# Patient Record
Sex: FEMALE | Race: WHITE | Employment: UNEMPLOYED | URBAN - METROPOLITAN AREA
[De-identification: names, ages, dates, MRNs, and addresses within clinical notes are randomized per-mention and may not be internally consistent; named-entity substitution may affect disease eponyms.]

---

## 2019-09-24 ENCOUNTER — APPOINTMENT (OUTPATIENT)
Dept: GENERAL RADIOLOGY | Age: 29
End: 2019-09-24
Payer: COMMERCIAL

## 2019-09-24 ENCOUNTER — HOSPITAL ENCOUNTER (EMERGENCY)
Age: 29
Discharge: HOME | End: 2019-09-24
Payer: COMMERCIAL

## 2019-09-24 VITALS
TEMPERATURE: 98.4 F | OXYGEN SATURATION: 99 % | SYSTOLIC BLOOD PRESSURE: 122 MMHG | RESPIRATION RATE: 16 BRPM | WEIGHT: 170 LBS | DIASTOLIC BLOOD PRESSURE: 86 MMHG | BODY MASS INDEX: 28.32 KG/M2 | HEART RATE: 99 BPM | HEIGHT: 65 IN

## 2019-09-24 DIAGNOSIS — S59.911A INJURY OF RIGHT FOREARM, INITIAL ENCOUNTER: Primary | ICD-10-CM

## 2019-09-24 PROCEDURE — 73090 X-RAY EXAM OF FOREARM: CPT

## 2019-09-24 PROCEDURE — 99283 EMERGENCY DEPT VISIT LOW MDM: CPT

## 2019-09-24 ASSESSMENT — PAIN DESCRIPTION - PAIN TYPE: TYPE: ACUTE PAIN

## 2019-09-24 ASSESSMENT — PAIN DESCRIPTION - ORIENTATION: ORIENTATION: RIGHT

## 2019-09-24 ASSESSMENT — PAIN DESCRIPTION - LOCATION: LOCATION: ARM

## 2019-09-24 ASSESSMENT — PAIN SCALES - GENERAL: PAINLEVEL_OUTOF10: 4

## 2019-09-24 NOTE — ED NOTES
Discharge instructions discussed with no questions or concerns. Pt. Juan R Hill understanding to follow up with orthopedics if she is not feeling better. Pt's. Right forearm wrapped with ace wrap. Pt. Ambulatory upon discharge with no signs of distress noted.        Karon Foreman RN  09/24/19 [id number]

## 2021-02-27 ENCOUNTER — HOSPITAL ENCOUNTER (INPATIENT)
Age: 31
LOS: 1 days | Discharge: HOME | DRG: 465 | End: 2021-02-28
Attending: INTERNAL MEDICINE | Admitting: INTERNAL MEDICINE
Payer: COMMERCIAL

## 2021-02-27 DIAGNOSIS — N17.9 AKI (ACUTE KIDNEY INJURY) (HCC): ICD-10-CM

## 2021-02-27 DIAGNOSIS — N13.30 HYDROURETERONEPHROSIS: Primary | ICD-10-CM

## 2021-02-27 DIAGNOSIS — N20.0 KIDNEY STONE: ICD-10-CM

## 2021-02-27 PROCEDURE — 96375 TX/PRO/DX INJ NEW DRUG ADDON: CPT

## 2021-02-27 PROCEDURE — 96374 THER/PROPH/DIAG INJ IV PUSH: CPT

## 2021-02-27 PROCEDURE — 99283 EMERGENCY DEPT VISIT LOW MDM: CPT

## 2021-02-27 RX ORDER — 0.9 % SODIUM CHLORIDE 0.9 %
1000 INTRAVENOUS SOLUTION INTRAVENOUS ONCE
Status: COMPLETED | OUTPATIENT
Start: 2021-02-27 | End: 2021-02-28

## 2021-02-27 ASSESSMENT — PAIN SCALES - GENERAL: PAINLEVEL_OUTOF10: 8

## 2021-02-28 ENCOUNTER — APPOINTMENT (OUTPATIENT)
Dept: GENERAL RADIOLOGY | Age: 31
DRG: 465 | End: 2021-02-28
Payer: COMMERCIAL

## 2021-02-28 ENCOUNTER — APPOINTMENT (OUTPATIENT)
Dept: CT IMAGING | Age: 31
DRG: 465 | End: 2021-02-28
Payer: COMMERCIAL

## 2021-02-28 ENCOUNTER — ANESTHESIA (OUTPATIENT)
Dept: OPERATING ROOM | Age: 31
DRG: 465 | End: 2021-02-28
Payer: COMMERCIAL

## 2021-02-28 ENCOUNTER — ANESTHESIA EVENT (OUTPATIENT)
Dept: OPERATING ROOM | Age: 31
DRG: 465 | End: 2021-02-28
Payer: COMMERCIAL

## 2021-02-28 VITALS
DIASTOLIC BLOOD PRESSURE: 78 MMHG | TEMPERATURE: 97.8 F | HEART RATE: 61 BPM | WEIGHT: 214 LBS | OXYGEN SATURATION: 98 % | HEIGHT: 65 IN | BODY MASS INDEX: 35.65 KG/M2 | SYSTOLIC BLOOD PRESSURE: 114 MMHG | RESPIRATION RATE: 18 BRPM

## 2021-02-28 VITALS
SYSTOLIC BLOOD PRESSURE: 81 MMHG | RESPIRATION RATE: 8 BRPM | OXYGEN SATURATION: 100 % | DIASTOLIC BLOOD PRESSURE: 50 MMHG

## 2021-02-28 PROBLEM — F43.23 ADJUSTMENT DISORDER WITH MIXED ANXIETY AND DEPRESSED MOOD: Status: ACTIVE | Noted: 2017-03-15

## 2021-02-28 PROBLEM — N13.30 HYDROURETERONEPHROSIS: Status: ACTIVE | Noted: 2021-02-28

## 2021-02-28 LAB
A/G RATIO: 1.5 (ref 1.1–2.2)
ALBUMIN SERPL-MCNC: 3.9 G/DL (ref 3.4–5)
ALBUMIN SERPL-MCNC: 4.5 G/DL (ref 3.4–5)
ALP BLD-CCNC: 72 U/L (ref 40–129)
ALT SERPL-CCNC: 19 U/L (ref 10–40)
ANION GAP SERPL CALCULATED.3IONS-SCNC: 8 MMOL/L (ref 3–16)
ANION GAP SERPL CALCULATED.3IONS-SCNC: 9 MMOL/L (ref 3–16)
AST SERPL-CCNC: 22 U/L (ref 15–37)
BASOPHILS ABSOLUTE: 0.1 K/UL (ref 0–0.2)
BASOPHILS RELATIVE PERCENT: 0.7 %
BILIRUB SERPL-MCNC: <0.2 MG/DL (ref 0–1)
BILIRUBIN URINE: NEGATIVE
BLOOD, URINE: ABNORMAL
BUN BLDV-MCNC: 15 MG/DL (ref 7–20)
BUN BLDV-MCNC: 18 MG/DL (ref 7–20)
CALCIUM SERPL-MCNC: 9 MG/DL (ref 8.3–10.6)
CALCIUM SERPL-MCNC: 9.8 MG/DL (ref 8.3–10.6)
CHLORIDE BLD-SCNC: 102 MMOL/L (ref 99–110)
CHLORIDE BLD-SCNC: 106 MMOL/L (ref 99–110)
CLARITY: CLEAR
CO2: 26 MMOL/L (ref 21–32)
CO2: 29 MMOL/L (ref 21–32)
COLOR: YELLOW
CREAT SERPL-MCNC: 1 MG/DL (ref 0.6–1.1)
CREAT SERPL-MCNC: 1.4 MG/DL (ref 0.6–1.1)
EOSINOPHILS ABSOLUTE: 0.2 K/UL (ref 0–0.6)
EOSINOPHILS RELATIVE PERCENT: 2.8 %
EPITHELIAL CELLS, UA: 1 /HPF (ref 0–5)
GFR AFRICAN AMERICAN: 54
GFR AFRICAN AMERICAN: >60
GFR NON-AFRICAN AMERICAN: 45
GFR NON-AFRICAN AMERICAN: >60
GLOBULIN: 3.1 G/DL
GLUCOSE BLD-MCNC: 112 MG/DL (ref 70–99)
GLUCOSE BLD-MCNC: 97 MG/DL (ref 70–99)
GLUCOSE URINE: NEGATIVE MG/DL
HCG QUALITATIVE: NEGATIVE
HCT VFR BLD CALC: 41.9 % (ref 36–48)
HCT VFR BLD CALC: 43.8 % (ref 36–48)
HEMOGLOBIN: 13.7 G/DL (ref 12–16)
HEMOGLOBIN: 14.5 G/DL (ref 12–16)
HYALINE CASTS: 1 /LPF (ref 0–8)
KETONES, URINE: NEGATIVE MG/DL
LEUKOCYTE ESTERASE, URINE: ABNORMAL
LIPASE: 23 U/L (ref 13–60)
LYMPHOCYTES ABSOLUTE: 2.2 K/UL (ref 1–5.1)
LYMPHOCYTES RELATIVE PERCENT: 28.2 %
MCH RBC QN AUTO: 30.2 PG (ref 26–34)
MCH RBC QN AUTO: 30.3 PG (ref 26–34)
MCHC RBC AUTO-ENTMCNC: 32.6 G/DL (ref 31–36)
MCHC RBC AUTO-ENTMCNC: 33.1 G/DL (ref 31–36)
MCV RBC AUTO: 91.7 FL (ref 80–100)
MCV RBC AUTO: 92.7 FL (ref 80–100)
MICROSCOPIC EXAMINATION: YES
MONOCYTES ABSOLUTE: 0.8 K/UL (ref 0–1.3)
MONOCYTES RELATIVE PERCENT: 10.2 %
NEUTROPHILS ABSOLUTE: 4.5 K/UL (ref 1.7–7.7)
NEUTROPHILS RELATIVE PERCENT: 58.1 %
NITRITE, URINE: NEGATIVE
PDW BLD-RTO: 13.1 % (ref 12.4–15.4)
PDW BLD-RTO: 13.6 % (ref 12.4–15.4)
PH UA: 5.5 (ref 5–8)
PHOSPHORUS: 4 MG/DL (ref 2.5–4.9)
PLATELET # BLD: 213 K/UL (ref 135–450)
PLATELET # BLD: 259 K/UL (ref 135–450)
PMV BLD AUTO: 6.8 FL (ref 5–10.5)
PMV BLD AUTO: 7.3 FL (ref 5–10.5)
POTASSIUM REFLEX MAGNESIUM: 4.4 MMOL/L (ref 3.5–5.1)
POTASSIUM SERPL-SCNC: 4.4 MMOL/L (ref 3.5–5.1)
PROTEIN UA: NEGATIVE MG/DL
RBC # BLD: 4.52 M/UL (ref 4–5.2)
RBC # BLD: 4.78 M/UL (ref 4–5.2)
RBC UA: 15 /HPF (ref 0–4)
SODIUM BLD-SCNC: 140 MMOL/L (ref 136–145)
SODIUM BLD-SCNC: 140 MMOL/L (ref 136–145)
SPECIFIC GRAVITY UA: 1.02 (ref 1–1.03)
TOTAL PROTEIN: 7.6 G/DL (ref 6.4–8.2)
URINE REFLEX TO CULTURE: ABNORMAL
URINE TYPE: ABNORMAL
UROBILINOGEN, URINE: 0.2 E.U./DL
WBC # BLD: 7.7 K/UL (ref 4–11)
WBC # BLD: 8.6 K/UL (ref 4–11)
WBC UA: 3 /HPF (ref 0–5)

## 2021-02-28 PROCEDURE — 85027 COMPLETE CBC AUTOMATED: CPT

## 2021-02-28 PROCEDURE — 80053 COMPREHEN METABOLIC PANEL: CPT

## 2021-02-28 PROCEDURE — 2580000003 HC RX 258: Performed by: PHYSICIAN ASSISTANT

## 2021-02-28 PROCEDURE — 2580000003 HC RX 258: Performed by: ANESTHESIOLOGY

## 2021-02-28 PROCEDURE — C1769 GUIDE WIRE: HCPCS | Performed by: UROLOGY

## 2021-02-28 PROCEDURE — 2709999900 HC NON-CHARGEABLE SUPPLY: Performed by: UROLOGY

## 2021-02-28 PROCEDURE — 2500000003 HC RX 250 WO HCPCS: Performed by: ANESTHESIOLOGY

## 2021-02-28 PROCEDURE — 0TJB8ZZ INSPECTION OF BLADDER, VIA NATURAL OR ARTIFICIAL OPENING ENDOSCOPIC: ICD-10-PCS | Performed by: UROLOGY

## 2021-02-28 PROCEDURE — 6370000000 HC RX 637 (ALT 250 FOR IP): Performed by: UROLOGY

## 2021-02-28 PROCEDURE — 74177 CT ABD & PELVIS W/CONTRAST: CPT

## 2021-02-28 PROCEDURE — 6370000000 HC RX 637 (ALT 250 FOR IP): Performed by: PHYSICIAN ASSISTANT

## 2021-02-28 PROCEDURE — 1200000000 HC SEMI PRIVATE

## 2021-02-28 PROCEDURE — 3700000001 HC ADD 15 MINUTES (ANESTHESIA): Performed by: UROLOGY

## 2021-02-28 PROCEDURE — 3600000004 HC SURGERY LEVEL 4 BASE: Performed by: UROLOGY

## 2021-02-28 PROCEDURE — 83690 ASSAY OF LIPASE: CPT

## 2021-02-28 PROCEDURE — C1894 INTRO/SHEATH, NON-LASER: HCPCS | Performed by: UROLOGY

## 2021-02-28 PROCEDURE — 6360000002 HC RX W HCPCS: Performed by: ANESTHESIOLOGY

## 2021-02-28 PROCEDURE — G0008 ADMIN INFLUENZA VIRUS VAC: HCPCS | Performed by: UROLOGY

## 2021-02-28 PROCEDURE — 2580000003 HC RX 258: Performed by: UROLOGY

## 2021-02-28 PROCEDURE — 90686 IIV4 VACC NO PRSV 0.5 ML IM: CPT | Performed by: UROLOGY

## 2021-02-28 PROCEDURE — 85025 COMPLETE CBC W/AUTO DIFF WBC: CPT

## 2021-02-28 PROCEDURE — 6360000002 HC RX W HCPCS: Performed by: PHYSICIAN ASSISTANT

## 2021-02-28 PROCEDURE — 3700000000 HC ANESTHESIA ATTENDED CARE: Performed by: UROLOGY

## 2021-02-28 PROCEDURE — 81001 URINALYSIS AUTO W/SCOPE: CPT

## 2021-02-28 PROCEDURE — 6360000002 HC RX W HCPCS: Performed by: UROLOGY

## 2021-02-28 PROCEDURE — 7100000000 HC PACU RECOVERY - FIRST 15 MIN: Performed by: UROLOGY

## 2021-02-28 PROCEDURE — 3600000014 HC SURGERY LEVEL 4 ADDTL 15MIN: Performed by: UROLOGY

## 2021-02-28 PROCEDURE — 84703 CHORIONIC GONADOTROPIN ASSAY: CPT

## 2021-02-28 PROCEDURE — 6360000004 HC RX CONTRAST MEDICATION: Performed by: PHYSICIAN ASSISTANT

## 2021-02-28 PROCEDURE — 36415 COLL VENOUS BLD VENIPUNCTURE: CPT

## 2021-02-28 PROCEDURE — 7100000001 HC PACU RECOVERY - ADDTL 15 MIN: Performed by: UROLOGY

## 2021-02-28 RX ORDER — ONDANSETRON 2 MG/ML
4 INJECTION INTRAMUSCULAR; INTRAVENOUS
Status: DISCONTINUED | OUTPATIENT
Start: 2021-02-28 | End: 2021-02-28

## 2021-02-28 RX ORDER — SODIUM CHLORIDE 0.9 % (FLUSH) 0.9 %
10 SYRINGE (ML) INJECTION EVERY 12 HOURS SCHEDULED
Status: DISCONTINUED | OUTPATIENT
Start: 2021-02-28 | End: 2021-02-28 | Stop reason: HOSPADM

## 2021-02-28 RX ORDER — CEFAZOLIN SODIUM 1 G/3ML
INJECTION, POWDER, FOR SOLUTION INTRAMUSCULAR; INTRAVENOUS PRN
Status: DISCONTINUED | OUTPATIENT
Start: 2021-02-28 | End: 2021-02-28 | Stop reason: SDUPTHER

## 2021-02-28 RX ORDER — ONDANSETRON 2 MG/ML
INJECTION INTRAMUSCULAR; INTRAVENOUS PRN
Status: DISCONTINUED | OUTPATIENT
Start: 2021-02-28 | End: 2021-02-28 | Stop reason: SDUPTHER

## 2021-02-28 RX ORDER — MEPERIDINE HYDROCHLORIDE 25 MG/ML
12.5 INJECTION INTRAMUSCULAR; INTRAVENOUS; SUBCUTANEOUS EVERY 5 MIN PRN
Status: DISCONTINUED | OUTPATIENT
Start: 2021-02-28 | End: 2021-02-28

## 2021-02-28 RX ORDER — ACETAMINOPHEN 500 MG
1000 TABLET ORAL EVERY 6 HOURS PRN
Status: DISCONTINUED | OUTPATIENT
Start: 2021-02-28 | End: 2021-02-28 | Stop reason: HOSPADM

## 2021-02-28 RX ORDER — ONDANSETRON 2 MG/ML
4 INJECTION INTRAMUSCULAR; INTRAVENOUS ONCE
Status: COMPLETED | OUTPATIENT
Start: 2021-02-28 | End: 2021-02-28

## 2021-02-28 RX ORDER — HYDRALAZINE HYDROCHLORIDE 20 MG/ML
5 INJECTION INTRAMUSCULAR; INTRAVENOUS EVERY 10 MIN PRN
Status: DISCONTINUED | OUTPATIENT
Start: 2021-02-28 | End: 2021-02-28

## 2021-02-28 RX ORDER — LIDOCAINE HYDROCHLORIDE 20 MG/ML
INJECTION, SOLUTION INFILTRATION; PERINEURAL PRN
Status: DISCONTINUED | OUTPATIENT
Start: 2021-02-28 | End: 2021-02-28 | Stop reason: SDUPTHER

## 2021-02-28 RX ORDER — MORPHINE SULFATE 4 MG/ML
4 INJECTION, SOLUTION INTRAMUSCULAR; INTRAVENOUS
Status: DISCONTINUED | OUTPATIENT
Start: 2021-02-28 | End: 2021-02-28 | Stop reason: HOSPADM

## 2021-02-28 RX ORDER — FENTANYL CITRATE 50 UG/ML
INJECTION, SOLUTION INTRAMUSCULAR; INTRAVENOUS PRN
Status: DISCONTINUED | OUTPATIENT
Start: 2021-02-28 | End: 2021-02-28 | Stop reason: SDUPTHER

## 2021-02-28 RX ORDER — HYDROMORPHONE HCL 110MG/55ML
0.5 PATIENT CONTROLLED ANALGESIA SYRINGE INTRAVENOUS EVERY 5 MIN PRN
Status: DISCONTINUED | OUTPATIENT
Start: 2021-02-28 | End: 2021-02-28

## 2021-02-28 RX ORDER — PROPOFOL 10 MG/ML
INJECTION, EMULSION INTRAVENOUS PRN
Status: DISCONTINUED | OUTPATIENT
Start: 2021-02-28 | End: 2021-02-28 | Stop reason: SDUPTHER

## 2021-02-28 RX ORDER — SODIUM CHLORIDE 9 MG/ML
INJECTION, SOLUTION INTRAVENOUS CONTINUOUS
Status: DISCONTINUED | OUTPATIENT
Start: 2021-02-28 | End: 2021-02-28 | Stop reason: HOSPADM

## 2021-02-28 RX ORDER — OXYCODONE HYDROCHLORIDE AND ACETAMINOPHEN 5; 325 MG/1; MG/1
1 TABLET ORAL
Status: DISCONTINUED | OUTPATIENT
Start: 2021-02-28 | End: 2021-02-28

## 2021-02-28 RX ORDER — MORPHINE SULFATE 4 MG/ML
4 INJECTION, SOLUTION INTRAMUSCULAR; INTRAVENOUS ONCE
Status: COMPLETED | OUTPATIENT
Start: 2021-02-28 | End: 2021-02-28

## 2021-02-28 RX ORDER — MAGNESIUM HYDROXIDE 1200 MG/15ML
LIQUID ORAL CONTINUOUS PRN
Status: COMPLETED | OUTPATIENT
Start: 2021-02-28 | End: 2021-02-28

## 2021-02-28 RX ORDER — SODIUM CHLORIDE 9 MG/ML
INJECTION, SOLUTION INTRAVENOUS CONTINUOUS PRN
Status: DISCONTINUED | OUTPATIENT
Start: 2021-02-28 | End: 2021-02-28 | Stop reason: SDUPTHER

## 2021-02-28 RX ORDER — DEXAMETHASONE SODIUM PHOSPHATE 4 MG/ML
INJECTION, SOLUTION INTRA-ARTICULAR; INTRALESIONAL; INTRAMUSCULAR; INTRAVENOUS; SOFT TISSUE PRN
Status: DISCONTINUED | OUTPATIENT
Start: 2021-02-28 | End: 2021-02-28 | Stop reason: SDUPTHER

## 2021-02-28 RX ORDER — MORPHINE SULFATE 2 MG/ML
2 INJECTION, SOLUTION INTRAMUSCULAR; INTRAVENOUS
Status: DISCONTINUED | OUTPATIENT
Start: 2021-02-28 | End: 2021-02-28 | Stop reason: HOSPADM

## 2021-02-28 RX ORDER — ONDANSETRON 2 MG/ML
4 INJECTION INTRAMUSCULAR; INTRAVENOUS EVERY 6 HOURS PRN
Status: DISCONTINUED | OUTPATIENT
Start: 2021-02-28 | End: 2021-02-28 | Stop reason: HOSPADM

## 2021-02-28 RX ORDER — SODIUM CHLORIDE 0.9 % (FLUSH) 0.9 %
10 SYRINGE (ML) INJECTION PRN
Status: DISCONTINUED | OUTPATIENT
Start: 2021-02-28 | End: 2021-02-28 | Stop reason: HOSPADM

## 2021-02-28 RX ORDER — LABETALOL HYDROCHLORIDE 5 MG/ML
5 INJECTION, SOLUTION INTRAVENOUS EVERY 10 MIN PRN
Status: DISCONTINUED | OUTPATIENT
Start: 2021-02-28 | End: 2021-02-28

## 2021-02-28 RX ADMIN — Medication 10 ML: at 08:18

## 2021-02-28 RX ADMIN — PROPOFOL 200 MG: 10 INJECTION, EMULSION INTRAVENOUS at 11:50

## 2021-02-28 RX ADMIN — CEFAZOLIN 2000 MG: 1 INJECTION, POWDER, FOR SOLUTION INTRAVENOUS at 11:50

## 2021-02-28 RX ADMIN — LIDOCAINE HYDROCHLORIDE 50 MG: 20 INJECTION, SOLUTION INFILTRATION; PERINEURAL at 11:50

## 2021-02-28 RX ADMIN — MORPHINE SULFATE 4 MG: 4 INJECTION, SOLUTION INTRAMUSCULAR; INTRAVENOUS at 01:27

## 2021-02-28 RX ADMIN — ONDANSETRON 4 MG: 2 INJECTION INTRAMUSCULAR; INTRAVENOUS at 01:27

## 2021-02-28 RX ADMIN — ACETAMINOPHEN 1000 MG: 500 TABLET, FILM COATED ORAL at 13:02

## 2021-02-28 RX ADMIN — MORPHINE SULFATE 2 MG: 2 INJECTION, SOLUTION INTRAMUSCULAR; INTRAVENOUS at 08:19

## 2021-02-28 RX ADMIN — SODIUM CHLORIDE: 9 INJECTION, SOLUTION INTRAVENOUS at 04:17

## 2021-02-28 RX ADMIN — INFLUENZA A VIRUS A/VICTORIA/2454/2019 IVR-207 (H1N1) ANTIGEN (PROPIOLACTONE INACTIVATED), INFLUENZA A VIRUS A/HONG KONG/2671/2019 IVR-208 (H3N2) ANTIGEN (PROPIOLACTONE INACTIVATED), INFLUENZA B VIRUS B/VICTORIA/705/2018 BVR-11 ANTIGEN (PROPIOLACTONE INACTIVATED), INFLUENZA B VIRUS B/PHUKET/3073/2013 BVR-1B ANTIGEN (PROPIOLACTONE INACTIVATED) 0.5 ML: 15; 15; 15; 15 INJECTION, SUSPENSION INTRAMUSCULAR at 14:19

## 2021-02-28 RX ADMIN — ONDANSETRON 4 MG: 2 INJECTION INTRAMUSCULAR; INTRAVENOUS at 11:50

## 2021-02-28 RX ADMIN — SERTRALINE 50 MG: 50 TABLET, FILM COATED ORAL at 08:16

## 2021-02-28 RX ADMIN — IOPAMIDOL 75 ML: 755 INJECTION, SOLUTION INTRAVENOUS at 01:08

## 2021-02-28 RX ADMIN — ENOXAPARIN SODIUM 40 MG: 40 INJECTION SUBCUTANEOUS at 08:17

## 2021-02-28 RX ADMIN — SODIUM CHLORIDE 1000 ML: 9 INJECTION, SOLUTION INTRAVENOUS at 00:33

## 2021-02-28 RX ADMIN — FENTANYL CITRATE 100 MCG: 50 INJECTION INTRAMUSCULAR; INTRAVENOUS at 11:50

## 2021-02-28 RX ADMIN — MORPHINE SULFATE 2 MG: 2 INJECTION, SOLUTION INTRAMUSCULAR; INTRAVENOUS at 04:20

## 2021-02-28 RX ADMIN — DEXAMETHASONE SODIUM PHOSPHATE 8 MG: 4 INJECTION, SOLUTION INTRAMUSCULAR; INTRAVENOUS at 11:50

## 2021-02-28 RX ADMIN — SODIUM CHLORIDE: 9 INJECTION, SOLUTION INTRAVENOUS at 11:52

## 2021-02-28 ASSESSMENT — PAIN DESCRIPTION - PAIN TYPE: TYPE: ACUTE PAIN

## 2021-02-28 ASSESSMENT — ENCOUNTER SYMPTOMS
VOMITING: 0
NAUSEA: 0
SHORTNESS OF BREATH: 0
ABDOMINAL PAIN: 1
DIARRHEA: 0

## 2021-02-28 ASSESSMENT — PAIN SCALES - GENERAL
PAINLEVEL_OUTOF10: 5
PAINLEVEL_OUTOF10: 5
PAINLEVEL_OUTOF10: 4
PAINLEVEL_OUTOF10: 4

## 2021-02-28 ASSESSMENT — PULMONARY FUNCTION TESTS
PIF_VALUE: 14
PIF_VALUE: 4
PIF_VALUE: 3
PIF_VALUE: 4
PIF_VALUE: 14
PIF_VALUE: 4
PIF_VALUE: 14
PIF_VALUE: 14
PIF_VALUE: 3
PIF_VALUE: 14

## 2021-02-28 ASSESSMENT — PAIN DESCRIPTION - FREQUENCY: FREQUENCY: CONTINUOUS

## 2021-02-28 ASSESSMENT — PAIN DESCRIPTION - LOCATION
LOCATION: ABDOMEN
LOCATION: HEAD

## 2021-02-28 ASSESSMENT — PAIN DESCRIPTION - ONSET: ONSET: ON-GOING

## 2021-02-28 ASSESSMENT — PAIN DESCRIPTION - ORIENTATION: ORIENTATION: LEFT

## 2021-02-28 NOTE — ED PROVIDER NOTES
[facility]        Pt Name: Brittany Cash  MRN: [id number]  [place] 1990  Date of evaluation: 2/27/2021  Provider: Jorge Tillman PA-C  [facility]P: [facility] ([facility])    АЛЕКСАНДР. I have evaluated this patient. My supervising physician was available for consultation. CHIEF COMPLAINT       Chief Complaint   Patient presents with    Abdominal Pain     LLQ abdominal pain today. pt can't sit down in triage. denies urinary symptoms. HISTORY OF PRESENT ILLNESS   (Location, Timing/Onset, Context/Setting, Quality, Duration, Modifying Factors, Severity, Associated Signs and Symptoms)  Note limiting factors. Brittany Cash is a 31 y.o. female patient presents emergency department for evaluation of left upper quadrant abdominal pain that began today. Patient states that her pain started around 10 PM.  Patient states she woke up this morning and just generally did not feel well but then had sharp left upper quadrant abdominal pain at 10 PM.  Patient states it has gotten slightly better since she arrived here at the hospital but is still severe. Patient did not take any medication prior to arrival here in the emergency department. She denies any nausea vomiting or diarrhea. Denies any urinary symptoms. Patient states the pain radiates into her left-sided back. Denies any fevers at home. Denies any history of kidney stones. Denies any additional symptoms at this time. Nursing Notes were all reviewed and agreed with or any disagreements were addressed in the HPI. REVIEW OF SYSTEMS    (2-9 systems for level 4, 10 or more for level 5)     Review of Systems   Constitutional: Negative for fatigue and fever. HENT: Negative. Eyes: Negative for visual disturbance. Respiratory: Negative for shortness of breath. Cardiovascular: Negative for chest pain. General: She is not in acute distress. Appearance: She is well-developed. She is not ill-appearing, toxic-appearing or diaphoretic. HENT:      Head: Normocephalic and atraumatic. Nose: Nose normal.   Eyes:      General:         Right eye: No discharge. Left eye: No discharge. Neck:      Musculoskeletal: Normal range of motion and neck supple. Cardiovascular:      Rate and Rhythm: Normal rate and regular rhythm. Heart sounds: Normal heart sounds. No murmur. No gallop. Pulmonary:      Effort: Pulmonary effort is normal. No respiratory distress. Breath sounds: Normal breath sounds. No wheezing, rhonchi or rales. Abdominal:      General: Abdomen is flat. Bowel sounds are normal.      Palpations: Abdomen is soft. Tenderness: There is abdominal tenderness in the left upper quadrant. There is no right CVA tenderness, left CVA tenderness, guarding or rebound. Musculoskeletal: Normal range of motion. Skin:     General: Skin is warm and dry. Capillary Refill: Capillary refill takes less than 2 seconds. Coloration: Skin is not pale. Neurological:      General: No focal deficit present. Mental Status: She is alert and oriented to person, place, and time.    Psychiatric:         Behavior: Behavior normal.         DIAGNOSTIC RESULTS   LABS:    Labs Reviewed   COMPREHENSIVE METABOLIC PANEL W/ REFLEX TO MG FOR LOW K - Abnormal; Notable for the following components:       Result Value    CREATININE 1.4 (*)     GFR Non- 45 (*)     GFR  54 (*)     All other components within normal limits    Narrative:     Performed at:  [facility] [address], [place], [facility] [address]   Phone [phone]   URINE RT REFLEX TO CULTURE - Abnormal; Notable for the following components:    Blood, Urine MODERATE (*)     Leukocyte Esterase, Urine SMALL (*)     All other components within normal limits    Narrative: Performed at:  [facility]  [address],  [place], [address]   Phone [phone]   MICROSCOPIC URINALYSIS - Abnormal; Notable for the following components:    RBC, UA 15 (*)     All other components within normal limits    Narrative:     Performed at:  [facility] [address],  [place], [address]   Phone [phone]   CBC WITH AUTO DIFFERENTIAL    Narrative:     Performed at:  [facility] [address],  [place], [address]   Phone [phone]   LIPASE    Narrative:     Performed at:  [facility] [address] [facility],  [place], [address]   Phone [phone]   HCG, SERUM, QUALITATIVE    Narrative:     Performed at:  [facility] [address],  [place], [address]   Phone [phone]       All other labs were within normal range or not returned as of this dictation. EKG: All EKG's are interpreted by the Emergency Department Physician in the absence of a cardiologist.  Please see their note for interpretation of EKG. RADIOLOGY:   Non-plain film images such as CT, Ultrasound and MRI are read by the radiologist. Plain radiographic images are visualized and preliminarily interpreted by the ED Provider with the below findings:        Interpretation per the Radiologist below, if available at the time of this note:    CT ABDOMEN PELVIS W IV CONTRAST Additional Contrast? None   Final Result   There is a 6 mm obstructing calculus in the left ureterovesical junction   resulting in mild left hydroureteronephrosis.       There is a 4 cm left adnexal cyst.           Ct Abdomen Pelvis W Iv Contrast Additional Contrast? None    Result Date: 2/28/2021 EXAMINATION: CT OF THE ABDOMEN AND PELVIS WITH CONTRAST 2/28/2021 12:54 am TECHNIQUE: CT of the abdomen and pelvis was performed with the administration of intravenous contrast. Multiplanar reformatted images are provided for review. Dose modulation, iterative reconstruction, and/or weight based adjustment of the mA/kV was utilized to reduce the radiation dose to as low as reasonably achievable. COMPARISON: None. HISTORY: ORDERING SYSTEM PROVIDED HISTORY: LUQ abd and flank pain FINDINGS: Lower Chest:   Visualized portion of the lower chest demonstrates no acute abnormality. Organs: There is a 6 mm obstructing calculus in the left ureterovesical junction resulting in mild left hydroureteronephrosis. The liver, spleen, pancreas, gallbladder, adrenal glands, and right kidney demonstrate no acute abnormality. GI/Bowel: No dilated or thick-walled loops of bowel. Appendix is unremarkable. Pelvis: There is a 4 cm left adnexal cyst. Peritoneum/Retroperitoneum: No abdominal aortic aneurysm. No adenopathy. No ascites. No free intraperitoneal air. Bones/Soft Tissues: No lytic or blastic osseous lesion. There is a 6 mm obstructing calculus in the left ureterovesical junction resulting in mild left hydroureteronephrosis.  There is a 4 cm left adnexal cyst.           PROCEDURES   Unless otherwise noted below, none     Procedures    CRITICAL CARE TIME   N/A    CONSULTS:  IP CONSULT TO UROLOGY  IP CONSULT TO HOSPITALIST      EMERGENCY DEPARTMENT COURSE and DIFFERENTIAL DIAGNOSIS/MDM:   Vitals:    Vitals:    02/28/21 0015 02/28/21 0030 02/28/21 0045 02/28/21 0100   BP: 113/67 129/69 126/75 107/66   Pulse:       Resp:       Temp:       TempSrc:       SpO2: 100% 100% 100% 99%   Weight:       Height:           Patient was given the following medications:  Medications   0.9 % sodium chloride bolus (1,000 mLs Intravenous New Bag 2/28/21 0033)   iopamidol (ISOVUE-370) 76 % injection 75 mL (75 mLs Intravenous Given 2/28/21 0108) morphine injection 4 mg (4 mg Intravenous Given 2/28/21 0127)   ondansetron (ZOFRAN) injection 4 mg (4 mg Intravenous Given 2/28/21 0127)         Patient presents to the emergency department for evaluation of left upper quadrant abdominal pain that started at 10 PM.  Patient is alert and oriented no acute distress. Vitals are stable and she is afebrile. Patient has point tenderness to the left upper quadrant abdomen. No CVA tenderness. Creatinine is 1.4 and GFR is 45. This is an acute change. Urine does not appear infected. No leukocytosis. Patient is given morphine and Zofran as well as 1 L of fluids here in the emergency department. CT shows 6 mm left UVJ ureterolithiasis with left hydroureteronephrosis. Given that patient has a new UMA she will be admitted to the hospitalist for further management overnight. Urology Vandana Shine was consulted and states the patient should be made n.p.o. for him to see in the morning. Patient is amenable to inpatient plan at this time and is excepted by hospitalist Cesar Salcido. FINAL IMPRESSION      1. Kidney stone    2. UMA (acute kidney injury) [facility])          DISPOSITION/PLAN   DISPOSITION Decision To Admit 02/28/2021 02:08:35 AM      PATIENT REFERREDTO:  No follow-up provider specified.     DISCHARGE MEDICATIONS:  New Prescriptions    No medications on file       DISCONTINUED MEDICATIONS:  Discontinued Medications    No medications on file              (Please note that portions of this note were completed with a voice recognition program.  Efforts were made to edit the dictations but occasionally words are mis-transcribed.)    Martha Kunz PA-C (electronically signed)            Martha Kunz PA-C  02/28/21 2991

## 2021-02-28 NOTE — DISCHARGE SUMMARY
Patient: Ajay Mahan     Gender: female  : 1990   Age: 31 y.o.   MRN: [id number]    Admitting Physician: Mee Zarate MD  Discharge Physician: Nakul Curran MD     Code Status: Full Code     Admit Date: 2021   Discharge Date:   2021    Disposition:  Home    Discharge Diagnoses:    Left flank pain due to ureteral stone with hydronephrosis and UMA spontaneous passage of stone    Active Hospital Problems    Diagnosis Date Noted    Hydroureteronephrosis [N13.30] 2021       Follow-up appointments:  one week    Outpatient to do list: BMP in 5-7 days     Condition at Discharge:  [address] Course:   53-year-old admitted to the hospital with left flank pain and CT abdomen noted to have hydronephrosis with left ureteral stone also creatinine noted to be elevated at 1.4 started on IV fluids pain medications urology were consulted by next day the creatinine had come down to 1 her pain and improved she subsequently underwent a cystoscopy and ureteroscopy with did not show any stone it was felt that there was spontaneous passage of the stone patient did well and is being discharged home she has been advised to go back to the emergency department if she has any fevers blood in urine or any increased pain she voiced understanding of given her basic metabolic profile to be done in 5 to 7 days and follow-up with the primary care physician    Discharge Medications:   Current Discharge Medication List        Current Discharge Medication List        Current Discharge Medication List      CONTINUE these medications which have NOT CHANGED    Details   sertraline (ZOLOFT) 25 MG tablet Take 1 tablet by mouth daily  Qty: 30 tablet, Refills: 0      HYDROcodone-acetaminophen (NORCO) 5-325 MG per tablet Take 1 tablet by mouth every 6 hours as needed for Pain .      pyridoxine (B-6) 25 MG tablet Take 1 tablet by mouth every 6 hours  Qty: 100 tablet, Refills: 0 metoclopramide (REGLAN) 10 MG tablet Take 1 tablet by mouth 4 times daily WARNING:  May cause drowsiness. May impair ability to operate vehicles or machinery. Do not use in combination with alcohol. Qty: 20 tablet, Refills: 0      Prenatal Vit-Fe Fumarate-FA (PRENATAL VITAMIN) 27-0.8 MG TABS Take 1 tablet by mouth daily  Qty: 30 tablet, Refills: 1           Current Discharge Medication List      STOP taking these medications       Ibuprofen (MOTRIN PO) Comments:   Reason for Stopping:               Discharge ROS:  A complete review of systems was asked and negative     Discharge Exam:    /78   Pulse 61   Temp 97.8 °F (36.6 °C) (Oral)   Resp 18   Ht 5' 5\" (1.651 m)   Wt 214 lb (97.1 kg)   SpO2 98%   BMI 35.61 kg/m²   General appearance:  NAD  HEENT:   Normal cephalic, atraumatic, moist mucous membranes, no oropharyngeal erythema or exudate  Heart[de-identified] Normal s1/s2, RRR, no murmurs, gallops, or rubs. no leg edema  Lungs:  Normal respiratory effort. Clear to auscultation, bilaterally without Rales/Wheezes/Rhonchi. Abdomen: Soft, non-tender, non-distended, bowel sounds present, no masses  Musculoskeletal:  No clubbing, no cyanosis, *  Neurologic:  Neurovascularly intact without any focal sensory/motor deficits. Cranial nerves: II-XII intact, grossly non-focal.    Labs:  For convenience and continuity at follow-up the following most recent labs are provided:    Lab Results   Component Value Date    WBC 8.6 02/28/2021    HGB 13.7 02/28/2021    HCT 41.9 02/28/2021    MCV 92.7 02/28/2021     02/28/2021     02/28/2021    K 4.4 02/28/2021    K 4.4 02/28/2021     02/28/2021    CO2 26 02/28/2021    BUN 15 02/28/2021    CREATININE 1.0 02/28/2021    CALCIUM 9.0 02/28/2021    PHOS 4.0 02/28/2021    ALKPHOS 72 02/28/2021    ALT 19 02/28/2021    AST 22 02/28/2021    BILITOT <0.2 02/28/2021    LABALBU 3.9 02/28/2021     No results found for: INR

## 2021-02-28 NOTE — PROGRESS NOTES
Patient alert, VSS and O2 sat maintained on RA. Denies pain. Tolerating PO. Patient meets all phase 1 discharge criteria, seen by anesthesia. Will transfer back to [address] [place] [address] in stable condition.

## 2021-02-28 NOTE — H&P
Hospital Medicine History & Physical      Patient Name: Renny Luna    : 1990    [facility]P: [facility]    Date of Service:  Patient seen and examined on 2021     Chief Complaint:  Abdominal pain    History Of Present Illness:    Renny Luna is a 31 y.o. female who presented to ED with complaint of sharp, LUQ abdominal pain which began yesterday evening around 10 PM. Pain radiates through to the left side of her back. She denies fever, chills, dizziness, chest pain, shortness of breath, cough, edema, nausea, vomiting, diarrhea, constipation, urinary symptoms. She denies any history of kidney stones. Labs and imaging performed in ED reviewed. CT abdomen/pelvis notable for 6 mm obstructing calculus in the left UVJ resulting in mild left hydroureteronephrosis. Cr 1.4, baseline 0.5. UA negative for UTI. Past Medical History:    Patient has a past medical history of Adjustment disorder with mixed anxiety and depressed mood. Past Surgical History:    Patient has a past surgical history that includes Dilation and curettage of uterus. Medications Prior to Admission:      Prior to Admission medications    Medication Sig Start Date End Date Taking? Authorizing Provider   sertraline (ZOLOFT) 25 MG tablet Take 1 tablet by mouth daily 17  Yes LOLIS Butler   Ibuprofen (MOTRIN PO) Take by mouth    Historical Provider, MD   HYDROcodone-acetaminophen (NORCO) 5-325 MG per tablet Take 1 tablet by mouth every 6 hours as needed for Pain . Historical Provider, MD   pyridoxine (B-6) 25 MG tablet Take 1 tablet by mouth every 6 hours 16   Margarito De Jesus MD   metoclopramide (REGLAN) 10 MG tablet Take 1 tablet by mouth 4 times daily WARNING:  May cause drowsiness. May impair ability to operate vehicles or machinery.   Do not use in combination with alcohol. 16   Roderick Mitchell MD   Prenatal Vit-Fe Fumarate-FA (PRENATAL VITAMIN) 27-0.8 MG TABS 02/28/21  0030   AST 22   ALT 19   BILITOT <0.2   ALKPHOS 72       COAG  No results for input(s): INR in the last 72 hours. CARDIAC ENZYMES  No results for input(s): TROPONINI in the last 72 hours. LIPIDS  No results found for: CHOL, TRIG, HDL, LDLCALC      Radiology:     CT ABDOMEN PELVIS W IV CONTRAST Additional Contrast? None   Final Result   There is a 6 mm obstructing calculus in the left ureterovesical junction   resulting in mild left hydroureteronephrosis. There is a 4 cm left adnexal cyst.             ASSESSMENT/PLAN:    Calculus of ureterovesical junction  6 mm obstructing stone left UVJ with mild left hydroureteronephrosis  UA negative for UTI  IVF  Pain control  NPO in anticipation of surgery in AM  Urology consulted    UMA  Cr baseline 0.5, today 1.4  Due to above  IVF  Avoid nephrotoxic medications  Renally dose medications  Monitor for electrolyte abnormalities      DVT prophylaxis: Lovenox  Probiotic if on abx: N/A    Diet: Diet NPO Effective Now Exceptions are: Sips of Water with Meds  Code Status: Full Code    Consults:  IP CONSULT TO UROLOGY  IP CONSULT TO HOSPITALIST    Disposition: Admit to Inpatient   ELOS: Greater than two midnights due to medical therapy     Tobias Cooper PA-C    Thank you [facility] for the opportunity to be involved in this patient's care. If you have any questions or concerns please feel free to contact me at [phone].

## 2021-02-28 NOTE — OP NOTE
A 21 fr rigid cystoscope was advance via a normal appearing urethra into the bladder. The bladder was inspected and there were no suspicious lesions, stones or diverticula seen. Attention was turned to the left ureteral orifice and a 0.035 sensor wire was advance up to the renal pelvis under control of fluoroscopy. A semi-rigid ureteroscope was passed alongside the safety wire. The ureteroscope was advanced up to the UPJ and NO stone was seen. There was edema and inflammation in the distal ureter c/w recently obstructed stone location. There was also no stone seen in the bladder space. At this point the ureteroscope was removed, and the wire was removed. The bladder was drained and the cystoscope was removed. At the end of the procedure all counts were correct. The patient tolerated the procedure well and was transported to the PACU in stable condition. Findings: no stone seen in the ureter or bladder lumen. This is c/w a recently passed stone, no longer present. Estimated Blood Loss: minimal                  Drains: none          Specimens: none    Complications: none apparent           Disposition:  PACU - hemodynamically stable.             Lea Nice MD  2/28/2021

## 2021-02-28 NOTE — PROGRESS NOTES
Patient has been discharged per MD orders. Patient verbalizes understanding of all instructions, medications, and follow up. IV has been removed, catheter intact, patient tolerated well. All belongings have been gathered and packed. Family present  to transport patient from hospital. RN transports patient, via wheelchair, to awaiting private vehicle.

## 2021-02-28 NOTE — CONSULTS
[facility] Consult Note  Inpatient Setting - [facility]    Provider: Cb Cruz MD Patient ID:  Admission Date: 2021 Name: Corie Rooney  OR Date: n/a MRN: [id number]   Patient Location: [id number] : 1990  Attending: Mariano Gallegos MD Date of Service: 2021  PCP: [facility]     Diagnoses: UMA  LEFT sided ureteral stone with proximal hydronephrosis    Assessment/Plan:  NPO   Discussed ureteroscopy with risks not limited to infection, blood in the urine, injury, need for additional procedures, anesthesia related, and stent side effects  The decision to proceed to the OR was made on the day of the consultation. All the patients questions were answered in detail. She understands the plan as listed above. CC:   Chief Complaint   Patient presents with    Abdominal Pain     LLQ abdominal pain today. pt can't sit down in triage. denies urinary symptoms. HPI: Corie Rooney is a 31 y.o. female. I saw the patient today for LEFT side pain, and associated symptoms of nausea and emesis, that have been present for days. Symptoms relieved by some pain medications and aggravated by the obstructing stone and relative dehydration. She has tried the following treatments: she has no history of stones. Past Medical History:   She has a past medical history of Adjustment disorder with mixed anxiety and depressed mood. Past Surgical History:  She has a past surgical history that includes Dilation and curettage of uterus. Allergies:   No Known Allergies    Social History:  She reports that she has been smoking cigarettes. She has been smoking about 0.25 packs per day. She has never used smokeless tobacco. She reports that she does not drink alcohol.     Family History: family history is not on file. Medications:   Scheduled Meds:   sertraline  50 mg Oral Daily    sodium chloride flush  10 mL Intravenous 2 times per day    enoxaparin  40 mg Subcutaneous Daily    influenza virus vaccine  0.5 mL Intramuscular Prior to discharge     Continuous Infusions:   sodium chloride 100 mL/hr at 02/28/21 0417     PRN Meds:sodium chloride flush, magnesium hydroxide, acetaminophen, ondansetron, morphine **OR** morphine    Review of Systems:  Constitutional: Negative for fever    Genitourinary: see HPI  Eyes: negative for sudden change in vision  EENT: no complaints  Cardiovascular: Negative for chest pain  Respiratory: Negative for shortness of breath  Gastrointestinal: Negative for nausea  Musculoskeletal: Negative for back pain   Neurological: Negative for weakness  Psychiatric: Negative for anxiety  Integumentary: Negative for rashes or adenopathy     Physical Exam:  Vitals:    02/28/21 0800   BP: 104/70   Pulse: 80   Resp: 18   Temp: 98.3 °F (36.8 °C)   SpO2: 98%     Constitutional: NAD, well-developed, well-nourished. HEENT: MMM. Hearing intact. PERRL  Neck: no thyroid masses appreciated. Trachea is midline. Neck appears unremarkable   Lymph: no palpable adenopathy in supraclavicular, or axillary lymph nodes  Cardiovascular: Regular rate. No peripheral edema  Respiratory: Respirations are even and non-labored. No audible breath sounds. Abdomen: Soft. No distension, tenderness, hernias, masses or guarding. No CVA tenderness. No hernias appreciated. Liver and spleen appear normal  Psychiatric: A + O x 3, normal affect. Insight appears intact. Muskuloskeletal: ANDREW x 4   Skin: Pink, warm and dry. No rashes on face and arms.     Labs:  Lab Results   Component Value Date    WBC 8.6 02/28/2021    HGB 13.7 02/28/2021    HCT 41.9 02/28/2021    MCV 92.7 02/28/2021     02/28/2021     Lab Results   Component Value Date    CREATININE 1.0 02/28/2021    BUN 15 02/28/2021  02/28/2021    K 4.4 02/28/2021     02/28/2021    CO2 26 02/28/2021       Imaging:   Impression   There is a 6 mm obstructing calculus in the left ureterovesical junction   resulting in mild left hydroureteronephrosis.       There is a 4 cm left adnexal cyst.     Tommie Flores MD  2/28/2021

## 2021-02-28 NOTE — ANESTHESIA PRE PROCEDURE
Department of Anesthesiology  Preprocedure Note       Name:  Gonzales Salmon   Age:  31 y.o.  :  1990                                          MRN:  [id number]         Date:  2021      Surgeon: Sabi Borja):  Kendall Louis MD    Procedure: Procedure(s):  CYSTOSCOPY, LEFT URETEROSCOPY, LEFT RETROGRADE PYELOGRAM LASER LITHOTRIPSY, LEFT STENT    Medications prior to admission:   Prior to Admission medications    Medication Sig Start Date End Date Taking? Authorizing Provider   Ibuprofen (MOTRIN PO) Take by mouth   Yes Historical Provider, MD   sertraline (ZOLOFT) 25 MG tablet Take 1 tablet by mouth daily 17  Yes LOLIS Ramirez   HYDROcodone-acetaminophen (NORCO) 5-325 MG per tablet Take 1 tablet by mouth every 6 hours as needed for Pain . Historical Provider, MD   pyridoxine (B-6) 25 MG tablet Take 1 tablet by mouth every 6 hours 16   Devyn Lira MD   metoclopramide (REGLAN) 10 MG tablet Take 1 tablet by mouth 4 times daily WARNING:  May cause drowsiness. May impair ability to operate vehicles or machinery.   Do not use in combination with alcohol. 16   Barbra Fernandez MD   Prenatal Vit-Fe Fumarate-FA (PRENATAL VITAMIN) 27-0.8 MG TABS Take 1 tablet by mouth daily 16   Barbra Fernandez MD       Current medications:    Current Facility-Administered Medications   Medication Dose Route Frequency Provider Last Rate Last Admin    sertraline (ZOLOFT) tablet 50 mg  50 mg Oral Daily Arianne Dial, PA-C   50 mg at 21 0816    0.9 % sodium chloride infusion   Intravenous Continuous Arianne Dial, PA-C 100 mL/hr at 21 0417 New Bag at 21 0417    sodium chloride flush 0.9 % injection 10 mL  10 mL Intravenous 2 times per day Arianne Dial, PA-C   10 mL at 21 0818    sodium chloride flush 0.9 % injection 10 mL  10 mL Intravenous PRN Arianne Dial, PA-C  enoxaparin (LOVENOX) injection 40 mg  40 mg Subcutaneous Daily Denney Runner, PA-C   40 mg at 02/28/21 5654    magnesium hydroxide (MILK OF MAGNESIA) 400 MG/5ML suspension 30 mL  30 mL Oral Daily PRN Denney Runner, PA-C        acetaminophen (TYLENOL) tablet 1,000 mg  1,000 mg Oral Q6H PRN Denney Runner, PA-C        [facility]ron [facility]) injection 4 mg  4 mg Intravenous Q6H PRN Denney Runner, PA-C        morphine (PF) injection 2 mg  2 mg Intravenous Q3H PRN Denney Runner, PA-C   2 mg at 02/28/21 6449    Or    morphine injection 4 mg  4 mg Intravenous Q3H PRN Denney Runner, PA-C        influenza quadrivalent split vaccine (FLUZONE;FLUARIX;FLULAVAL;AFLURIA) injection 0.5 mL  0.5 mL Intramuscular Prior to discharge Bruna Boudreaux MD           Allergies:  No Known Allergies    Problem List:    Patient Active Problem List   Diagnosis Code    Adjustment disorder with mixed anxiety and depressed mood F43.23    Hydroureteronephrosis N13.30       Past Medical History:        Diagnosis Date    Adjustment disorder with mixed anxiety and depressed mood        Past Surgical History:        Procedure Laterality Date    DILATION AND CURETTAGE OF UTERUS         Social History:    Social History     Tobacco Use    Smoking status: Current Every Day Smoker     Packs/day: 0.25     Types: Cigarettes    Smokeless tobacco: Never Used   Substance Use Topics    Alcohol use:  No                                Ready to quit: Not Answered  Counseling given: Not Answered      Vital Signs (Current):   Vitals:    02/28/21 0330 02/28/21 0357 02/28/21 0510 02/28/21 0800   BP: 113/73 119/72 119/72 104/70   Pulse:  73 73 80   Resp:  16  18   Temp:  98.4 °F (36.9 °C)  98.3 °F (36.8 °C)   TempSrc:  Oral  Oral   SpO2: 100% 98%  98%   Weight:  214 lb (97.1 kg)     Height:  5' 5\" (1.651 m)                                                BP Readings from Last 3 Encounters:   02/28/21 104/70   09/24/19 122/86 04/24/17 127/73       NPO Status:                                                                                 BMI:   Wt Readings from Last 3 Encounters:   02/28/21 214 lb (97.1 kg)   09/24/19 170 lb (77.1 kg)   04/24/17 200 lb (90.7 kg)     Body mass index is 35.61 kg/m². CBC:   Lab Results   Component Value Date    WBC 8.6 02/28/2021    RBC 4.52 02/28/2021    HGB 13.7 02/28/2021    HCT 41.9 02/28/2021    MCV 92.7 02/28/2021    RDW 13.6 02/28/2021     02/28/2021       CMP:   Lab Results   Component Value Date     02/28/2021    K 4.4 02/28/2021    K 4.4 02/28/2021     02/28/2021    CO2 26 02/28/2021    BUN 15 02/28/2021    CREATININE 1.0 02/28/2021    GFRAA >60 02/28/2021    AGRATIO 1.5 02/28/2021    LABGLOM >60 02/28/2021    GLUCOSE 112 02/28/2021    PROT 7.6 02/28/2021    CALCIUM 9.0 02/28/2021    BILITOT <0.2 02/28/2021    ALKPHOS 72 02/28/2021    AST 22 02/28/2021    ALT 19 02/28/2021       POC Tests: No results for input(s): POCGLU, POCNA, POCK, POCCL, POCBUN, POCHEMO, POCHCT in the last 72 hours.     Coags: No results found for: PROTIME, INR, APTT    HCG (If Applicable):   Lab Results   Component Value Date    PREGTESTUR POSITIVE 08/22/2016        ABGs: No results found for: PHART, PO2ART, YTE7AXX, APU0GQM, BEART, P7GJLDFW     Type & Screen (If Applicable):  No results found for: LABABO, LABRH    Drug/Infectious Status (If Applicable):  No results found for: HIV, HEPCAB    COVID-19 Screening (If Applicable): No results found for: COVID19      Anesthesia Evaluation  Patient summary reviewed and Nursing notes reviewed  Airway: Mallampati: III        Dental:          Pulmonary:                              Cardiovascular:  Exercise tolerance: good (>4 METS),                     Neuro/Psych:   (+) psychiatric history:            GI/Hepatic/Renal:             Endo/Other:                     Abdominal:           Vascular:                                        Anesthesia Plan      general ASA 2 - emergent                                 Princess Spivey MD   2/28/2021

## 2021-02-28 NOTE — PROGRESS NOTES
Hospitalist Progress Note      [facility]P: [facility] ([facility])    Date of Admission: 2/27/2021    Chief Complaint: Left flank pain    Hospital Course:   Complaining of left flank pain currently controlled with pain medication  No nausea vomiting  No blood in urine  No fevers      Medications:  Reviewed      Exam:    /70   Pulse 80   Temp 98.3 °F (36.8 °C) (Oral)   Resp 18   Ht 5' 5\" (1.651 m)   Wt 214 lb (97.1 kg)   SpO2 98%   BMI 35.61 kg/m²     General appearance: No apparent distress, appears stated age and cooperative. HEENT: Pupils equal, round, and reactive to light. Conjunctivae/corneas clear. Neck: Supple, with full range of motion. No jugular venous distention. Trachea midline. Respiratory:  Normal respiratory effort. Clear to auscultation, bilaterally without RALES/WHEEZES/Rhonchi. Cardiovascular: Regular rate and rhythm with normal S1/S2 without MURMURS, rubs or gallops. Abdomen: Soft, non-tender, non-distended with normal bowel sounds. Musculoskeletal: No clubbing, cyanosis or EDEMA bilaterally. Full range of motion without deformity. Skin: Skin color, texture, turgor normal.  No rashes or lesions. Neurologic:  Neurovascularly intact without any focal sensory/motor deficits. Cranial nerves: II-XII intact, grossly non-focal.        Labs:   Recent Labs     02/28/21  0030 02/28/21  0522   WBC 7.7 8.6   HGB 14.5 13.7   HCT 43.8 41.9    213     Recent Labs     02/28/21  0030 02/28/21  0522    140   K 4.4 4.4    106   CO2 29 26   BUN 18 15   CREATININE 1.4* 1.0   CALCIUM 9.8 9.0   PHOS  --  4.0     Recent Labs     02/28/21  0030   AST 22   ALT 19   BILITOT <0.2   ALKPHOS 72     No results for input(s): INR in the last 72 hours. No results for input(s): Christine Josee in the last 72 hours.     Urinalysis:      Lab Results   Component Value Date    NITRU Negative 02/28/2021    WBCUA 3 02/28/2021    BACTERIA 2+ 08/29/2016    RBCUA 15 02/28/2021 BLOODU MODERATE 02/28/2021    SPECGRAV 1.023 02/28/2021    GLUCOSEU Negative 02/28/2021       Radiology:  CT ABDOMEN PELVIS W IV CONTRAST Additional Contrast? None   Final Result   There is a 6 mm obstructing calculus in the left ureterovesical junction   resulting in mild left hydroureteronephrosis.       There is a 4 cm left adnexal cyst.             Assessment/Plan:    Active Hospital Problems    Diagnosis Date Noted    Hydroureteronephrosis [N13.30] 02/28/2021       Acute Medical Issues Being Addressed:    57-year-old admitted to the hospital with left flank pain secondary to left  Ureteral stone with proximal hydronephrosis  No evidence of UTI  Seen by urology  4 stone extraction and stent placement  Pain control  IV fluids  N.p.o.    Acute kidney injury postrenal secondary to obstruction  Improving with IV fluids      DVT Prophylaxis: sc lovenox   Diet: Diet NPO Effective Now Exceptions are: Sips of Water with Meds  Code Status: Full Code      Dispo - once acute medical processes have resolved    Andrea Whitmore MD

## 2021-02-28 NOTE — ANESTHESIA POSTPROCEDURE EVALUATION
Department of Anesthesiology  Postprocedure Note    Patient: Elizabeth Sherwood  MRN: [id number]  YOB: 1990  Date of evaluation: 2/28/2021  Time:  12:18 PM     Procedure Summary     Date: 02/28/21 Room / Location: [address] [place] [address]    Anesthesia Start: 3141 Anesthesia Stop: 2221    Procedure: CYSTOSCOPY, LEFT URETEROSCOPY, (Left ) Diagnosis: (left renal calculus)    Surgeons: Dean Hsu MD Responsible Provider: Aditi Rivas MD    Anesthesia Type: general ASA Status: 2 - Emergent          Anesthesia Type: general    Meena Phase I:      Meena Phase II:      Last vitals: Reviewed and per EMR flowsheets.        Anesthesia Post Evaluation    Patient location during evaluation: PACU  Patient participation: complete - patient participated  Level of consciousness: awake  Airway patency: patent  Nausea & Vomiting: no nausea and no vomiting  Complications: no  Cardiovascular status: blood pressure returned to baseline  Respiratory status: acceptable  Hydration status: euvolemic

## 2021-02-28 NOTE — PROGRESS NOTES
Nursing communication received from Urology:   [facility] alert admitting MD service of completion with urology surgery. Manny Dolan was no stone seen in the bladder or ureter.  She had likely already passed the stone prior to the operation today.  The expectation is that she can probably go home later today.  Regular diet has been resumed she can follow-up with urology as needed. Soha Salcedo should likely do fine with Tylenol and ibuprofen. \"  Attending notified via secure message.

## 2021-02-28 NOTE — PROGRESS NOTES
Patient transferred from Or to PACU, VSS and O2 sat maintained on RA. Denies pain. IVF infusing. Continue to monitor.

## 2021-02-28 NOTE — PLAN OF CARE
Problem: Sensory:  Goal: Ability to identify factors that increase the pain will improve  Description: Ability to identify factors that increase the pain will improve  Outcome: Ongoing  Goal: Pain level will decrease  Description: Pain level will decrease  Outcome: Ongoing     Problem: Pain:  Description: Pain management should include both nonpharmacologic and pharmacologic interventions.   Goal: Pain level will decrease  Description: Pain level will decrease  Outcome: Ongoing  Goal: Control of acute pain  Description: Control of acute pain  Outcome: Ongoing  Goal: Control of chronic pain  Description: Control of chronic pain  Outcome: Ongoing

## (undated) DEVICE — URETERAL ACCESS SHEATH SET: Brand: NAVIGATOR HD

## (undated) DEVICE — GLOVE ORANGE PI 7   MSG9070

## (undated) DEVICE — BAG DRAINAGE NS

## (undated) DEVICE — Device: Brand: MEDEX

## (undated) DEVICE — CYSTO/BLADDER IRRIGATION SET, REGULATING CLAMP

## (undated) DEVICE — CYSTO PACK: Brand: MEDLINE INDUSTRIES, INC.

## (undated) DEVICE — MERCY FAIRFIELD TURNOVER KIT: Brand: MEDLINE INDUSTRIES, INC.

## (undated) DEVICE — SOLUTION IV IRRIG WATER 1000ML POUR BRL 2F7114

## (undated) DEVICE — SYRINGE MED 10ML SLIP TIP BLNT FILL AND LUERLOCK DISP

## (undated) DEVICE — Z DUP USE 2522782 SOLUTION IRRIG 1000ML STRL H2O PLAS CONTAINER UROMATIC

## (undated) DEVICE — TRAY PREP DRY W/ PREM GLV 2 APPL 6 SPNG 2 UNDPD 1 OVERWRAP

## (undated) DEVICE — GUIDEWIRE ENDOSCP L150CM DIA0.035IN TIP 3CM PTFE NIT